# Patient Record
Sex: FEMALE | Race: WHITE | NOT HISPANIC OR LATINO
[De-identification: names, ages, dates, MRNs, and addresses within clinical notes are randomized per-mention and may not be internally consistent; named-entity substitution may affect disease eponyms.]

---

## 2021-03-15 PROBLEM — Z00.00 ENCOUNTER FOR PREVENTIVE HEALTH EXAMINATION: Status: ACTIVE | Noted: 2021-03-15

## 2021-03-23 ENCOUNTER — APPOINTMENT (OUTPATIENT)
Dept: PULMONOLOGY | Facility: CLINIC | Age: 21
End: 2021-03-23
Payer: COMMERCIAL

## 2021-03-23 ENCOUNTER — TRANSCRIPTION ENCOUNTER (OUTPATIENT)
Age: 21
End: 2021-03-23

## 2021-03-23 VITALS
WEIGHT: 110 LBS | OXYGEN SATURATION: 100 % | BODY MASS INDEX: 18.33 KG/M2 | HEIGHT: 65 IN | HEART RATE: 92 BPM | SYSTOLIC BLOOD PRESSURE: 106 MMHG | TEMPERATURE: 97.4 F | DIASTOLIC BLOOD PRESSURE: 70 MMHG

## 2021-03-23 DIAGNOSIS — Z80.9 FAMILY HISTORY OF MALIGNANT NEOPLASM, UNSPECIFIED: ICD-10-CM

## 2021-03-23 DIAGNOSIS — Z78.9 OTHER SPECIFIED HEALTH STATUS: ICD-10-CM

## 2021-03-23 DIAGNOSIS — A69.20 LYME DISEASE, UNSPECIFIED: ICD-10-CM

## 2021-03-23 DIAGNOSIS — R09.81 NASAL CONGESTION: ICD-10-CM

## 2021-03-23 DIAGNOSIS — A44.9 BARTONELLOSIS, UNSPECIFIED: ICD-10-CM

## 2021-03-23 DIAGNOSIS — F45.8 OTHER SOMATOFORM DISORDERS: ICD-10-CM

## 2021-03-23 DIAGNOSIS — Q79.60 EHLERS-DANLOS SYNDROME, UNSP: ICD-10-CM

## 2021-03-23 DIAGNOSIS — J30.89 OTHER ALLERGIC RHINITIS: ICD-10-CM

## 2021-03-23 DIAGNOSIS — J35.01 CHRONIC TONSILLITIS: ICD-10-CM

## 2021-03-23 DIAGNOSIS — E61.1 IRON DEFICIENCY: ICD-10-CM

## 2021-03-23 DIAGNOSIS — Z86.19 PERSONAL HISTORY OF OTHER INFECTIOUS AND PARASITIC DISEASES: ICD-10-CM

## 2021-03-23 DIAGNOSIS — G47.19 OTHER HYPERSOMNIA: ICD-10-CM

## 2021-03-23 LAB — EPWORTH SCORE: 17

## 2021-03-23 PROCEDURE — 99072 ADDL SUPL MATRL&STAF TM PHE: CPT

## 2021-03-23 PROCEDURE — 99215 OFFICE O/P EST HI 40 MIN: CPT

## 2021-03-23 RX ORDER — NORETHINDRONE ACETATE AND ETHINYL ESTRADIOL 1MG-20(24)
1-20 KIT ORAL
Qty: 84 | Refills: 0 | Status: ACTIVE | COMMUNITY
Start: 2020-12-22

## 2021-03-23 RX ORDER — MULTIVITAMIN
TABLET ORAL
Refills: 0 | Status: ACTIVE | COMMUNITY

## 2021-03-23 RX ORDER — NORETHINDRONE ACETATE AND ETHINYL ESTRADIOL 1MG-20(24)
KIT ORAL
Refills: 0 | Status: ACTIVE | COMMUNITY

## 2021-03-23 NOTE — REVIEW OF SYSTEMS
[Fatigue] : fatigue [EDS] : EDS [Chronic Pain] : chronic pain [Depression] : depression [Anxiety] : anxiety [Panic Attacks] : panic attacks [Negative] : Endocrine

## 2021-03-24 PROBLEM — E61.1 IRON DEFICIENCY: Status: ACTIVE | Noted: 2021-03-23

## 2021-03-24 PROBLEM — Q79.60 EHLERS-DANLOS DISEASE: Status: ACTIVE | Noted: 2021-03-23

## 2021-03-24 PROBLEM — F45.8 BRUXISM: Status: ACTIVE | Noted: 2021-03-23

## 2021-03-24 PROBLEM — J35.01 CHRONIC TONSILLITIS: Status: ACTIVE | Noted: 2021-03-23

## 2021-03-24 PROBLEM — J30.89 ENVIRONMENTAL AND SEASONAL ALLERGIES: Status: ACTIVE | Noted: 2021-03-23

## 2021-03-24 PROBLEM — G47.19 EXCESSIVE DAYTIME SLEEPINESS: Status: ACTIVE | Noted: 2021-03-23

## 2021-03-24 PROBLEM — A69.20 LYME DISEASE: Status: ACTIVE | Noted: 2021-03-23

## 2021-03-24 PROBLEM — R09.81 NASAL CONGESTION: Status: ACTIVE | Noted: 2021-03-23

## 2021-03-24 NOTE — HISTORY OF PRESENT ILLNESS
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 Patient says she has been having issues sleeping since 2016. She says most of her symptoms from Lyme disease improved except her sleep and daytime sleepiness. She states she can be sitting in a meeting or walking around the class room and she begins to nod off. This daytime sleepiness doesn't occur everyday, on some weeks it occurs only once a week while in other weeks it is more frequent. She is able to fall asleep quickly in about 5-10 minutes but there are weeks where she wakes up 1-2x night. There is nothing in particular that is bothering her when she wakes up during the night but says on some nights she does have to go to the bathroom. Her sleep quality at night does not affect how she feels during the day. The patient normally wakes up at 6am, takes a scheduled nap after work around 530pm to stay awake for her nighttime classes and then goes to sleep around 1030-11pm. She sleeps either at her apartment or her mother's house and both are comfortable. Her mother does snore at home. She states that people have told her she takes "deep breaths" during her sleep but she does not think she snores and she does not move a lot during her sleep but she also reports bruxism. She is generally more tired when she is on her menstrual cycle but it does not affect how often she wakes up in the middle of the night. She has a history of heavy menstrual periods for about a week when she is not birth control. She denies nasal congestion, rhinorrhea, heart burn, palpitations, SOB, chest pain, chest pressure, xerostomia, diarrhea, abdominal cramps, loss of appetite or muscle cramps/pain. She reports her typical joint pain. She was taking supplements prior to taking abx for Lyme. She was tested for Fe previously and took Fe supplementation without improvement in her symptoms. She does not think she she has allergies and denies clearing her throat frequently. She has tried taking a hot bath with Epsom salts, stretching at night and has taken melatonin without improvement in her sleep. She used to be a dancer until a couple years ago. She did not notice a difference on a gluten free diet but states she has trouble tolerating dairy (has diarrhea). She has been told she has large tonsils in the past. She reports hypermobility of her thumbs. [TextBox_77] : 1030-11 [TextBox_79] : 6 [TextBox_81] : 5-10 [TextBox_83] : yes [TextBox_85] : 7-8 [TextBox_87] : not sure [TextBox_89] : 1-2 [ESS] : 17 [TextBox_5] : 2 [TextBox_7] : 32 [TextBox_9] : 18

## 2021-03-24 NOTE — ASSESSMENT
[FreeTextEntry1] : The above was discussed at length with the patient who has an excellent understanding of the issues.

## 2021-03-24 NOTE — PHYSICAL EXAM
[No Acute Distress] : no acute distress [Normal Appearance] : normal appearance [No Neck Mass] : no neck mass [Normal Rate/Rhythm] : normal rate/rhythm [Normal S1, S2] : normal s1, s2 [No Murmurs] : no murmurs [No Resp Distress] : no resp distress [Clear to Auscultation Bilaterally] : clear to auscultation bilaterally [No Abnormalities] : no abnormalities [Benign] : benign [Normal Gait] : normal gait [No Clubbing] : no clubbing [No Cyanosis] : no cyanosis [No Edema] : no edema [FROM] : FROM [Normal Color/ Pigmentation] : normal color/ pigmentation [No Focal Deficits] : no focal deficits [Oriented x3] : oriented x3 [Normal Affect] : normal affect [Nasal congestion] : nasal congestion [1+] : Right Tonsil: 1+ [TextBox_11] : mild erythema, no tonsillar exudates